# Patient Record
Sex: MALE | Race: WHITE | NOT HISPANIC OR LATINO | URBAN - METROPOLITAN AREA
[De-identification: names, ages, dates, MRNs, and addresses within clinical notes are randomized per-mention and may not be internally consistent; named-entity substitution may affect disease eponyms.]

---

## 2017-03-26 ENCOUNTER — EMERGENCY (EMERGENCY)
Facility: HOSPITAL | Age: 42
LOS: 1 days | Discharge: HOME | End: 2017-03-26
Attending: EMERGENCY MEDICINE | Admitting: EMERGENCY MEDICINE
Payer: COMMERCIAL

## 2017-03-26 VITALS
WEIGHT: 179.9 LBS | TEMPERATURE: 98 F | DIASTOLIC BLOOD PRESSURE: 81 MMHG | HEART RATE: 94 BPM | OXYGEN SATURATION: 97 % | SYSTOLIC BLOOD PRESSURE: 128 MMHG | RESPIRATION RATE: 16 BRPM | HEIGHT: 69 IN

## 2017-03-26 DIAGNOSIS — M79.671 PAIN IN RIGHT FOOT: ICD-10-CM

## 2017-03-26 PROBLEM — Z00.00 ENCOUNTER FOR PREVENTIVE HEALTH EXAMINATION: Status: ACTIVE | Noted: 2017-03-26

## 2017-03-26 PROCEDURE — 99283 EMERGENCY DEPT VISIT LOW MDM: CPT | Mod: 25

## 2017-03-26 PROCEDURE — 99283 EMERGENCY DEPT VISIT LOW MDM: CPT

## 2017-03-26 PROCEDURE — 73630 X-RAY EXAM OF FOOT: CPT | Mod: 26,RT

## 2017-03-26 PROCEDURE — 73630 X-RAY EXAM OF FOOT: CPT

## 2017-03-26 RX ORDER — IBUPROFEN 200 MG
400 TABLET ORAL ONCE
Qty: 0 | Refills: 0 | Status: COMPLETED | OUTPATIENT
Start: 2017-03-26 | End: 2017-03-26

## 2017-03-26 RX ADMIN — Medication 400 MILLIGRAM(S): at 13:22

## 2017-03-26 NOTE — ED PROVIDER NOTE - OBJECTIVE STATEMENT
40yo male pt, no PMHx c/o right foot pain after a someone stepped on it last night. Denies other injuries. Denies sensory changes or weakness to extremities.

## 2017-03-26 NOTE — ED PROVIDER NOTE - MEDICAL DECISION MAKING DETAILS
Attending Note (Scot): patient with foot pain after someone stepped on it, right foot.  denies other injuries. well appearing. r/o fracture. ED read of xray reveals no acute pathology.

## 2017-03-26 NOTE — ED PROVIDER NOTE - PHYSICAL EXAMINATION
NAD, VSS, Afebrile, No spinal tender, + right 4,5th distal metatarsal tender, swelling, diminished ROMs, No ankle tender, N/V- intact.

## 2017-03-26 NOTE — ED PROCEDURE NOTE - NS ED PERI VASCULAR NEG
fingers/toes warm to touch/no cyanosis of extremity/capillary refill time < 2 seconds/no paresthesia

## 2020-09-20 ENCOUNTER — EMERGENCY (EMERGENCY)
Facility: HOSPITAL | Age: 45
LOS: 1 days | Discharge: HOME | End: 2020-09-20
Attending: STUDENT IN AN ORGANIZED HEALTH CARE EDUCATION/TRAINING PROGRAM
Payer: COMMERCIAL

## 2020-09-20 VITALS
WEIGHT: 179.9 LBS | TEMPERATURE: 98 F | HEIGHT: 69 IN | DIASTOLIC BLOOD PRESSURE: 85 MMHG | OXYGEN SATURATION: 98 % | HEART RATE: 67 BPM | RESPIRATION RATE: 16 BRPM | SYSTOLIC BLOOD PRESSURE: 150 MMHG

## 2020-09-20 VITALS
HEART RATE: 60 BPM | TEMPERATURE: 98 F | SYSTOLIC BLOOD PRESSURE: 135 MMHG | RESPIRATION RATE: 18 BRPM | OXYGEN SATURATION: 99 % | DIASTOLIC BLOOD PRESSURE: 90 MMHG

## 2020-09-20 PROCEDURE — 12002 RPR S/N/AX/GEN/TRNK2.6-7.5CM: CPT

## 2020-09-20 PROCEDURE — 99283 EMERGENCY DEPT VISIT LOW MDM: CPT | Mod: 25

## 2020-09-20 RX ORDER — ACETAMINOPHEN 500 MG
975 TABLET ORAL ONCE
Refills: 0 | Status: COMPLETED | OUTPATIENT
Start: 2020-09-20 | End: 2020-09-20

## 2020-09-20 RX ADMIN — Medication 975 MILLIGRAM(S): at 21:05

## 2020-09-20 NOTE — ED ADULT NURSE NOTE - OBJECTIVE STATEMENT
44y Male A&Ox3 came to ED c/o laceration. No PMH or PSH. Pt states he was walking up stairs with objects, slipped on top step and hit head on door, Pt had 2 inch laceration on head, no dizziness, LOC, nausea. Pt presents with 2 inch laceration on top of head, bleeding controlled, abrasions to L knee. Denies chest pain, sob, ha, n/v/d, abdominal pain, f/c, urinary symptoms, hematuria. Upon examination, full facial symmetry, no JVD or trach deviation, lung sounds clear and adequate chest rise, S1 and S2 heart sounds present, +2 pulses in all extremities with full ROM and equal strength, cap refill <2 seconds, ABD soft, non distended and non tender, pelvis intact.

## 2020-09-20 NOTE — ED PROVIDER NOTE - NSFOLLOWUPINSTRUCTIONS_ED_ALL_ED_FT
Keep wound clean and dry washing with soap and water 24 hours later.  Bacitracin ointment to wound twice a day.  Tylenol for pain as needed.  Return in 10days for suture removal.  Please see the information of head injury instruction and sutured wound care.  Follow up with your primary Dr. for reevaluation or suture removal.    Return for any concerns or worsening symptoms.

## 2020-09-20 NOTE — ED PROVIDER NOTE - OBJECTIVE STATEMENT
44M no pmhx presenting w/ scalp laceration, pt states he was ambulating up the steps and then slipped due to carrying food in his hands, and head fell into the bottom of a screen door where there was a metal portion sticking out. No LOC. No vision changes. No numbness. No history of aspirin use or anticoagulation. 44M no pmhx presenting w/ scalp laceration, pt states he was ambulating up the steps and then slipped due to carrying food in his hands, and head fell into the bottom of a screen door where there was a metal portion sticking out. No LOC. No vision changes. No numbness. No history of aspirin use or anticoagulation.  Td-utded. 44M no pmhx presenting w/ scalp laceration sustained approx 1 hour prior, pt states he was ambulating up the steps and then slipped due to carrying food in his hands, and head fell into the bottom of a screen door where there was a metal portion sticking out. No LOC. No vision changes. No numbness. No history of aspirin use or anticoagulation. No headache or nausea/vomiting. States he did not fall down the stairs, just fell into the door. No neck pain. No weakness. Reports tetanus vaccine updated in last 5 years.

## 2020-09-20 NOTE — ED PROVIDER NOTE - PHYSICAL EXAMINATION
NAD, VSS, Afebrile, + PERRL, EOMI, + 5cm superficial laceration on frontal scalp without active bleeding or hematoma. No spinal tender. Lungs clear. ABD soft, non tender. No CVA tender. Neuro- intact.

## 2020-09-20 NOTE — ED PROVIDER NOTE - PATIENT PORTAL LINK FT
You can access the FollowMyHealth Patient Portal offered by [facility] by registering at the following website: [email or web address]. By joining [employer]’s FollowMyHealth portal, you will also be able to view your health information using other applications (apps) compatible with our system.

## 2020-09-20 NOTE — ED PROVIDER NOTE - ATTENDING CONTRIBUTION TO CARE
Collins DO: I have personally performed a face to face medical and diagnostic evaluation of the patient. I have discussed with and reviewed the ACP's note and agree with the History, ROS, Physical Exam and MDM unless otherwise indicated. A brief summary of my personal evaluation and impression can be found below.    44M w/ horizontal 5cm superficial scalp laceration from falling into door, no LOC, no neuro complaints, no headache, no antiplatelet or anticoagulant use, low mechanism, no midline c-t-l spine ttp, no sensory deficits, CN II-XII intact, no facial asymmetry, no dysarthria, no dysmetria, strength equal in all four extremities, no gait abnormality. no facial injuries, no chest wall ttp, no abd ttp. Injuries localized to scalp laceration, no indication for brain imaging due to low mechanism and low risk factors, plan for outpt f/u after lac repair, given precautions on concussion and return precautions.

## 2021-07-07 ENCOUNTER — EMERGENCY (EMERGENCY)
Facility: HOSPITAL | Age: 46
LOS: 1 days | Discharge: HOME | End: 2021-07-07
Attending: EMERGENCY MEDICINE
Payer: COMMERCIAL

## 2021-07-07 VITALS
RESPIRATION RATE: 16 BRPM | TEMPERATURE: 98 F | DIASTOLIC BLOOD PRESSURE: 87 MMHG | OXYGEN SATURATION: 97 % | SYSTOLIC BLOOD PRESSURE: 147 MMHG | HEART RATE: 50 BPM

## 2021-07-07 VITALS
HEART RATE: 67 BPM | DIASTOLIC BLOOD PRESSURE: 87 MMHG | WEIGHT: 184.97 LBS | HEIGHT: 69 IN | RESPIRATION RATE: 18 BRPM | TEMPERATURE: 98 F | SYSTOLIC BLOOD PRESSURE: 131 MMHG | OXYGEN SATURATION: 98 %

## 2021-07-07 PROCEDURE — 70450 CT HEAD/BRAIN W/O DYE: CPT

## 2021-07-07 PROCEDURE — 99284 EMERGENCY DEPT VISIT MOD MDM: CPT

## 2021-07-07 PROCEDURE — 99284 EMERGENCY DEPT VISIT MOD MDM: CPT | Mod: 25

## 2021-07-07 PROCEDURE — 70450 CT HEAD/BRAIN W/O DYE: CPT | Mod: 26,MA

## 2021-07-07 NOTE — ED ADULT NURSE NOTE - CHPI ED NUR SYMPTOMS NEG
no change in level of consciousness/no confusion/no dizziness/no loss of consciousness/no nausea/no seizure/no syncope/no vomiting/no weakness

## 2021-07-07 NOTE — ED PROVIDER NOTE - CLINICAL SUMMARY MEDICAL DECISION MAKING FREE TEXT BOX
Laura: pt with focal trauma to scalp. no sutures needed, awake alert neck no pain, will ct head because of level of force involved to local skull area.

## 2021-07-07 NOTE — ED ADULT NURSE REASSESSMENT NOTE - NS ED NURSE REASSESS COMMENT FT1
Called CT-patient will be sent upstairs for pending CTH. Transport in progress. Patient aware of plan

## 2021-07-07 NOTE — ED PROVIDER NOTE - NSFOLLOWUPINSTRUCTIONS_ED_ALL_ED_FT
You were seen in the emergency department for head trauma. Your discharge diagnosis is "closed head injury". Please read all attached patient information, read all additional instructions below, and follow-up with all providers as directed.    1) Follow-up with your primary care provider in 1-2 days.    2) Continue to take all medications as prescribed.    3) Rest and drink plenty of fluids. Pain can be managed with Acetaminophen (aka Tylenol)  over the counter as directed.    4) Return to the ER for any new or worsening symptoms.      Please read all attached patient information.

## 2021-07-07 NOTE — ED PROVIDER NOTE - PATIENT PORTAL LINK FT
You can access the FollowMyHealth Patient Portal offered by [facility] by registering at the following website: [email or web address]. By joining [employer]’s FollowMyHealth portal, you will also be able to view your health information using other applications (apps) compatible with our system.

## 2021-07-07 NOTE — ED PROVIDER NOTE - OBJECTIVE STATEMENT
44yo who presents with large hematoma and abraision on frontal scalp. 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 + bleeding. no loc. not on blood thinners, No neck pain. no N/V. has some sensation with eye motion, no jaw sx.

## 2021-07-07 NOTE — ED ADULT NURSE REASSESSMENT NOTE - NS ED NURSE REASSESS COMMENT FT1
Patient aware of delay in CT- patient reports minimal change in pain and denies current change in vision, n/v, numbness, tingling or weakness.

## 2021-07-07 NOTE — ED ADULT NURSE NOTE - OBJECTIVE STATEMENT
45 year old male patient presents ambulatory to ED c/o head injury. 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 Denies falling to the ground or LOC. Denies blood thinner use. Patient presents with swelling and small abrasion to L side of head, bleeding controlled PTA. Patient took 2 advil PTA and Cold pack applied. Patient denies visual changes but states "my R eye feels funny." Denies dizziness, weakness, numbness or tingling, n/v. Patient well appearing with no acute distress noted, ambulatory with steady gait. Last tetanus 6 years ago.

## 2024-12-20 NOTE — ED PROVIDER NOTE - ATTENDING CONTRIBUTION TO CARE
I performed a face to face evaluation of this patient and performed a history and physical examination on the patient.  I agree with the NP's history, physical examination, and plan of the patient.
Not applicable